# Patient Record
Sex: MALE | Race: ASIAN | NOT HISPANIC OR LATINO
[De-identification: names, ages, dates, MRNs, and addresses within clinical notes are randomized per-mention and may not be internally consistent; named-entity substitution may affect disease eponyms.]

---

## 2023-01-18 PROBLEM — Z00.00 ENCOUNTER FOR PREVENTIVE HEALTH EXAMINATION: Status: ACTIVE | Noted: 2023-01-18

## 2023-02-06 ENCOUNTER — APPOINTMENT (OUTPATIENT)
Dept: UROLOGY | Facility: CLINIC | Age: 33
End: 2023-02-06
Payer: COMMERCIAL

## 2023-02-06 VITALS
OXYGEN SATURATION: 99 % | BODY MASS INDEX: 37.51 KG/M2 | DIASTOLIC BLOOD PRESSURE: 95 MMHG | HEART RATE: 89 BPM | HEIGHT: 66.93 IN | WEIGHT: 239 LBS | RESPIRATION RATE: 18 BRPM | SYSTOLIC BLOOD PRESSURE: 138 MMHG | TEMPERATURE: 96.8 F

## 2023-02-06 DIAGNOSIS — Z86.79 PERSONAL HISTORY OF OTHER DISEASES OF THE CIRCULATORY SYSTEM: ICD-10-CM

## 2023-02-06 DIAGNOSIS — Z82.49 FAMILY HISTORY OF ISCHEMIC HEART DISEASE AND OTHER DISEASES OF THE CIRCULATORY SYSTEM: ICD-10-CM

## 2023-02-06 DIAGNOSIS — Z83.3 FAMILY HISTORY OF DIABETES MELLITUS: ICD-10-CM

## 2023-02-06 PROCEDURE — 99203 OFFICE O/P NEW LOW 30 MIN: CPT

## 2023-02-06 PROCEDURE — 76775 US EXAM ABDO BACK WALL LIM: CPT

## 2023-02-06 NOTE — PHYSICAL EXAM
[General Appearance - Well Developed] : well developed [Normal Appearance] : normal appearance [Edema] : no peripheral edema [Exaggerated Use Of Accessory Muscles For Inspiration] : no accessory muscle use [Abdomen Soft] : soft [Abdomen Tenderness] : non-tender [Normal Station and Gait] : the gait and station were normal for the patient's age [No Focal Deficits] : no focal deficits [Urinary Bladder Findings] : the bladder was normal on palpation [] : no rash [Oriented To Time, Place, And Person] : oriented to person, place, and time [Affect] : the affect was normal [Mood] : the mood was normal [Not Anxious] : not anxious

## 2023-02-06 NOTE — HISTORY OF PRESENT ILLNESS
[FreeTextEntry1] : Patient is 32 year old M who has PMHx of HTN presents here for evaluation or renal stone. \par \par Patient reports renal colic presented to  [facility] in Aug, 2022. Colic x1 day. CT ABD noted 2 mm calculus in left UVJ, mild left hydroureteronephrosis and punctate intrarenal left upper pole renal calculus. Patient was given tamsulosin for one month.\par Currently patient reports urinary hesitancy, straining while drinking less water, nocturia x1.  Patient drinks at most 1 L water and also 4 cups of tea per day.  If he drinks less thank 1 L water he will have LUTS.  If he drinks 1L or more with good volume he has no LUTS.\par Patient denied gross hematuria, frequency, dysuria, incomplete empty. Denied passing stone or previous episode of renal stone. \par Patient denied urological procedures. \par Family history of HTN (mother), DM (father). Denied family history of  malignance. \par Patient denied history of smoking or consumption of ETOH/substance use.

## 2023-02-06 NOTE — ASSESSMENT
[FreeTextEntry1] : Patient is a 33 yo M who presents for L nephrolithiasis.\par \par Prior renal colic episode in 8/2022.\par Currently asymptomatic.\par Mild LUTS if he does not drink enough.\par D/w pt that his stone is likely due to tea intake and inadequate water intake\par Counseled pt on stone dietary recommendations, such as increasing fluid intake and citrate intake (dominick), while decreasing salt, protein and oxalate. D/w pt that he needs to cut down on his tea intake\par Renal US today in office with stable punctate/small intrarenal L stone, nonobstructing.\par F/u 6-12 mos for repeat renal US

## 2023-02-07 ENCOUNTER — APPOINTMENT (OUTPATIENT)
Age: 33
End: 2023-02-07

## 2023-02-07 LAB
APPEARANCE: CLEAR
BACTERIA: NEGATIVE
BILIRUBIN URINE: NEGATIVE
BLOOD URINE: NEGATIVE
COLOR: NORMAL
GLUCOSE QUALITATIVE U: NEGATIVE
HYALINE CASTS: 0 /LPF
KETONES URINE: NEGATIVE
LEUKOCYTE ESTERASE URINE: NEGATIVE
MICROSCOPIC-UA: NORMAL
NITRITE URINE: NEGATIVE
PH URINE: 6
PROTEIN URINE: NORMAL
RED BLOOD CELLS URINE: 1 /HPF
SPECIFIC GRAVITY URINE: 1.02
SQUAMOUS EPITHELIAL CELLS: 3 /HPF
UROBILINOGEN URINE: NORMAL
WHITE BLOOD CELLS URINE: 2 /HPF

## 2023-08-07 ENCOUNTER — APPOINTMENT (OUTPATIENT)
Dept: UROLOGY | Facility: CLINIC | Age: 33
End: 2023-08-07
Payer: COMMERCIAL

## 2023-08-07 DIAGNOSIS — N47.1 PHIMOSIS: ICD-10-CM

## 2023-08-07 DIAGNOSIS — R39.11 HESITANCY OF MICTURITION: ICD-10-CM

## 2023-08-07 DIAGNOSIS — N20.0 CALCULUS OF KIDNEY: ICD-10-CM

## 2023-08-07 PROCEDURE — 99214 OFFICE O/P EST MOD 30 MIN: CPT

## 2023-08-07 PROCEDURE — 76775 US EXAM ABDO BACK WALL LIM: CPT

## 2023-08-07 RX ORDER — CLOTRIMAZOLE AND BETAMETHASONE DIPROPIONATE 10; .5 MG/G; MG/G
1-0.05 CREAM TOPICAL TWICE DAILY
Qty: 1 | Refills: 0 | Status: ACTIVE | COMMUNITY
Start: 2023-08-07 | End: 1900-01-01

## 2023-08-07 NOTE — ASSESSMENT
[FreeTextEntry1] : Patient is a 32 yo M who presents for L nephrolithiasis. He has hesitancy at times if he drinks less. He has phimosis on exam - d/w pt that hesitancy may be related to phimosis or fluid intake. Encouraged fluid intake - if LUTS persist, d/w pt that can perform cysto to assess further. D/w stone diet Will give topical cream for phimosis.  He is not bothered by phimosis, has no sexual issues. Renal US today in office with stable punctate/small intrarenal L stone, nonobstructing.  F/u 6 mos for renal sono

## 2023-08-07 NOTE — HISTORY OF PRESENT ILLNESS
[FreeTextEntry1] : Patient is 33 year old M who has PMHx of HTN presents here for evaluation or renal stone.   Patient reports renal colic presented to  [facility] in Aug, 2022. Colic x1 day. CT ABD noted 2 mm calculus in left UVJ, mild left hydroureteronephrosis and punctate intrarenal left upper pole renal calculus. Patient was given tamsulosin for one month.  Currently patient reports intermittent chronic urinary hesitancy, straining.  He feels that that he has LUTS while drinking less water, nocturia x1.  Patient drinks 4 bottles of water and has reduced tea. If he drinks less he will have LUTS.   Patient denied gross hematuria, frequency, dysuria, incomplete empty. Denied passing stone or previous episode of renal stone.  Patient denied urological procedures.  Family history of HTN (mother), DM (father). Denied family history of  malignance.  Patient denied history of smoking or consumption of ETOH/substance use.

## 2023-08-07 NOTE — PHYSICAL EXAM
[General Appearance - Well Developed] : well developed [General Appearance - Well Nourished] : well nourished [Normal Appearance] : normal appearance [Well Groomed] : well groomed [General Appearance - In No Acute Distress] : no acute distress [Scrotum] : the scrotum was normal [Testes Tenderness] : no tenderness of the testes [Testes Mass (___cm)] : there were no testicular masses [FreeTextEntry1] : phimotic foreskin, unable to retract to visualize meatus/glans [Oriented To Time, Place, And Person] : oriented to person, place, and time [Affect] : the affect was normal [Mood] : the mood was normal [Not Anxious] : not anxious

## 2024-02-05 ENCOUNTER — APPOINTMENT (OUTPATIENT)
Dept: UROLOGY | Facility: CLINIC | Age: 34
End: 2024-02-05